# Patient Record
Sex: MALE | Race: WHITE | NOT HISPANIC OR LATINO | URBAN - METROPOLITAN AREA
[De-identification: names, ages, dates, MRNs, and addresses within clinical notes are randomized per-mention and may not be internally consistent; named-entity substitution may affect disease eponyms.]

---

## 2019-08-18 ENCOUNTER — EMERGENCY (EMERGENCY)
Facility: HOSPITAL | Age: 39
LOS: 0 days | Discharge: HOME | End: 2019-08-18
Attending: EMERGENCY MEDICINE | Admitting: EMERGENCY MEDICINE
Payer: COMMERCIAL

## 2019-08-18 ENCOUNTER — TRANSCRIPTION ENCOUNTER (OUTPATIENT)
Age: 39
End: 2019-08-18

## 2019-08-18 VITALS
TEMPERATURE: 97 F | RESPIRATION RATE: 18 BRPM | WEIGHT: 199.96 LBS | DIASTOLIC BLOOD PRESSURE: 78 MMHG | SYSTOLIC BLOOD PRESSURE: 135 MMHG | OXYGEN SATURATION: 99 % | HEART RATE: 86 BPM | HEIGHT: 71 IN

## 2019-08-18 DIAGNOSIS — R09.81 NASAL CONGESTION: ICD-10-CM

## 2019-08-18 DIAGNOSIS — R51 HEADACHE: ICD-10-CM

## 2019-08-18 PROCEDURE — 99283 EMERGENCY DEPT VISIT LOW MDM: CPT

## 2019-08-18 RX ORDER — KETOROLAC TROMETHAMINE 30 MG/ML
60 SYRINGE (ML) INJECTION ONCE
Refills: 0 | Status: DISCONTINUED | OUTPATIENT
Start: 2019-08-18 | End: 2019-08-18

## 2019-08-18 RX ADMIN — Medication 60 MILLIGRAM(S): at 16:02

## 2019-08-18 NOTE — ED PROVIDER NOTE - ATTENDING CONTRIBUTION TO CARE
39 year old male, pmhx migraines, presenting with HA on the right side x 3 days. States it occasionally goes down to his jaw on the right side as well. Seen at  this AM and started on sudafed, nasal spray and given PO amox, which he took without relief at home x1. States pain is achy, radiating downward to his jaw, no palliative or provocative factors, mild severity. Otherwise denies vision changes, dizziness, fevers, nausea or vomiting.    Vital Signs: I have reviewed the initial vital signs.  Constitutional: NAD, well-nourished, appears stated age, no acute distress.  HEENT: Airway patent, moist MM, no erythema/swelling/deformity of oral structures. EOMI, PERRLA. (+) tenderness over right frontal sinus  CV: regular rate, regular rhythm, well-perfused extremities, 2+ b/l DP and radial pulses equal.  Lungs: BCTA, no increased WOB.  ABD: NTND, no guarding or rebound, no pulsatile mass, no hernias.   MSK: Neck supple, nontender, nl ROM, no stepoff. Chest nontender. Back nontender in TLS spine or to b/l bony structures or flanks. Ext nontender, nl rom, no deformity.   INTEG: Skin warm, dry, no rash.  NEURO: A&Ox3, normal strength, nl sensation throughout, normal speech.   PSYCH: Calm, cooperative, normal affect and interaction.    Likely 2/2 sinusitis. patient already prescribed abx, symptomatic tx at home from . Will give IM toradol here for pain relief, refer to outpatient ENT.

## 2019-08-18 NOTE — ED PROVIDER NOTE - OBJECTIVE STATEMENT
38yo M presents c/o a HA on the right side x 3 days. Pt states that the pain started in his right jaw and spread to the right side of the head and around his right eye. Pt went to an [facility] this morning and was started on Sudafed and a nasal spray, returned to the [facility] and requested ABX which he took Amox at 230pm. Pt denies any dizziness, visual changes, nausea, vomiting, fever.

## 2019-08-18 NOTE — ED ADULT NURSE NOTE - NSIMPLEMENTINTERV_GEN_ALL_ED
Implemented All Universal Safety Interventions:  [place] to call system. Call bell, personal items and telephone within reach. Instruct patient to call for assistance. Room bathroom lighting operational. Non-slip footwear when patient is off stretcher. Physically safe environment: no spills, clutter or unnecessary equipment. Stretcher in lowest position, wheels locked, appropriate side rails in place.

## 2019-08-18 NOTE — ED PROVIDER NOTE - CLINICAL SUMMARY MEDICAL DECISION MAKING FREE TEXT BOX
Patient presented with sinus head pain x 3 days. Otherwise afebrile, HD stable, fully neurovascular intact, ambulatory without difficulty. Tender over right frontal sinus. Patient already prescribed PO abx x1 day along with nasal spray and sudafed. Given IM toradol in ED with improvement. Will discharge home with outpatient ENT follow up. Patient agreeable with plan. Agrees to return to ED for any new or worsening symptoms.

## 2019-08-18 NOTE — ED PROVIDER NOTE - NSFOLLOWUPINSTRUCTIONS_ED_ALL_ED_FT
Sinusitis, Adult    Sinusitis is soreness and inflammation of your sinuses. Sinuses are hollow spaces in the bones around your face. Your sinuses are located:     Around your eyes.  In the middle of your forehead.  Behind your nose.  In your cheekbones.    Your sinuses and nasal passages are lined with a stringy fluid (mucus). Mucus normally drains out of your sinuses. When your nasal tissues become inflamed or swollen, the mucus can become trapped or blocked so air cannot flow through your sinuses. This allows bacteria, viruses, and funguses to grow, which leads to infection.    Sinusitis can develop quickly and last for 7?10 days (acute) or for more than 12 weeks (chronic). Sinusitis often develops after a cold.    CAUSES  This condition is caused by anything that creates swelling in the sinuses or stops mucus from draining, including:    Allergies.  Asthma.  Bacterial or viral infection.  Abnormally shaped bones between the nasal passages.  Nasal growths that contain mucus (nasal polyps).  Narrow sinus openings.  Pollutants, such as chemicals or irritants in the air.  A foreign object stuck in the nose.  A fungal infection. This is rare.     RISK FACTORS  The following factors may make you more likely to develop this condition:    Having allergies or asthma.  Having had a recent cold or respiratory tract infection.  Having structural deformities or blockages in your nose or sinuses.  Having a weak immune system.  Doing a lot of swimming or diving.  Overusing nasal sprays.  Smoking.    SYMPTOMS  The main symptoms of this condition are pain and a feeling of pressure around the affected sinuses. Other symptoms include:    Upper toothache.  Earache.  Headache.  Bad breath.  Decreased sense of smell and taste.  A cough that may get worse at night.  Fatigue.  Fever.  Thick drainage from your nose. The drainage is often green and it may contain pus (purulent).  Stuffy nose or congestion.  Postnasal drip. This is when extra mucus collects in the throat or back of the nose.  Swelling and warmth over the affected sinuses.  Sore throat.  Sensitivity to light.    DIAGNOSIS  This condition is diagnosed based on symptoms, a medical history, and a physical exam. To find out if your condition is acute or chronic, your health care provider may:    Look in your nose for signs of nasal polyps.  Tap over the affected sinus to check for signs of infection.  View the inside of your sinuses using an imaging device that has a light attached (endoscope).    If your health care provider suspects that you have chronic sinusitis, you may also:    Be tested for allergies.  Have a sample of mucus taken from your nose (nasal culture) and checked for bacteria.  Have a mucus sample examined to see if your sinusitis is related to an allergy.    If your sinusitis does not respond to treatment and it lasts longer than 8 weeks, you may have an MRI or CT scan to check your sinuses. These scans also help to determine how severe your infection is.    In rare cases, a bone biopsy may be done to rule out more serious types of fungal sinus disease.    TREATMENT  Treatment for sinusitis depends on the cause and whether your condition is chronic or acute. If a virus is causing your sinusitis, your symptoms will go away on their own within 10 days. You may be given medicines to relieve your symptoms, including:    Topical nasal decongestants. They shrink swollen nasal passages and let mucus drain from your sinuses.  Antihistamines. These drugs block inflammation that is triggered by allergies. This can help to ease swelling in your nose and sinuses.  Topical nasal corticosteroids. These are nasal sprays that ease inflammation and swelling in your nose and sinuses.  Nasal saline washes. These rinses can help to get rid of thick mucus in your nose.    If your condition is caused by bacteria, you will be given an antibiotic medicine. If your condition is caused by a fungus, you will be given an antifungal medicine.    Surgery may be needed to correct underlying conditions, such as narrow nasal passages. Surgery may also be needed to remove polyps.    HOME CARE INSTRUCTIONS  Medicines    Take, use, or apply over-the-counter and prescription medicines only as told by your health care provider. These may include nasal sprays.  If you were prescribed an antibiotic medicine, take it as told by your health care provider. Do not stop taking the antibiotic even if you start to feel better.    Hydrate and Humidify    Drink enough water to keep your urine clear or pale yellow. Staying hydrated will help to thin your mucus.  Use a cool mist humidifier to keep the humidity level in your home above 50%.  Inhale steam for 10–15 minutes, 3–4 times a day or as told by your health care provider. You can do this in the bathroom while a hot shower is running.  Limit your exposure to cool or dry air.    Rest    Rest as much as possible.  Sleep with your head raised (elevated).  Make sure to get enough sleep each night.    General Instructions    Apply a warm, moist washcloth to your face 3–4 times a day or as told by your health care provider. This will help with discomfort.  Wash your hands often with soap and water to reduce your exposure to viruses and other germs. If soap and water are not available, use hand .  Do not smoke. Avoid being around people who are smoking (secondhand smoke).  Keep all follow-up visits as told by your health care provider. This is important.    SEEK MEDICAL CARE IF:  You have a fever.  Your symptoms get worse.  Your symptoms do not improve within 10 days.    SEEK IMMEDIATE MEDICAL CARE IF:  You have a severe headache.  You have persistent vomiting.  You have pain or swelling around your face or eyes.  You have vision problems.  You develop confusion.  Your neck is stiff.  You have trouble breathing.    ADDITIONAL NOTES AND INSTRUCTIONS    Please follow up with your Primary MD in 24-48 hr.  Seek immediate medical care for any new/worsening signs or symptoms.

## 2019-08-18 NOTE — ED PROVIDER NOTE - CARE PROVIDER_API CALL
Franklin Conn)  Otolaryngology  [address] [place] [address], [address] [place] [address]  Phone: [phone]  Fax: [phone]  Follow Up Time:

## 2019-08-18 NOTE — ED PROVIDER NOTE - PHYSICAL EXAMINATION
ENT: right TM with fluid, no erythema, ENT:mild fluid appreciated behind right TM, no erythema, no buldging   non tender to sinuses to percussion  no dental tenderness or gum swelling

## 2019-08-19 ENCOUNTER — APPOINTMENT (OUTPATIENT)
Dept: OTOLARYNGOLOGY | Facility: CLINIC | Age: 39
End: 2019-08-19
Payer: COMMERCIAL

## 2019-08-19 VITALS — WEIGHT: 195 LBS | HEIGHT: 71 IN | BODY MASS INDEX: 27.3 KG/M2

## 2019-08-19 DIAGNOSIS — R51 HEADACHE: ICD-10-CM

## 2019-08-19 DIAGNOSIS — Z78.9 OTHER SPECIFIED HEALTH STATUS: ICD-10-CM

## 2019-08-19 DIAGNOSIS — Z87.09 PERSONAL HISTORY OF OTHER DISEASES OF THE RESPIRATORY SYSTEM: ICD-10-CM

## 2019-08-19 PROBLEM — Z00.00 ENCOUNTER FOR PREVENTIVE HEALTH EXAMINATION: Status: ACTIVE | Noted: 2019-08-19

## 2019-08-19 PROBLEM — G43.909 MIGRAINE, UNSPECIFIED, NOT INTRACTABLE, WITHOUT STATUS MIGRAINOSUS: Chronic | Status: ACTIVE | Noted: 2019-08-18

## 2019-08-19 PROCEDURE — 31231 NASAL ENDOSCOPY DX: CPT

## 2019-08-19 PROCEDURE — 99203 OFFICE O/P NEW LOW 30 MIN: CPT | Mod: 25

## 2019-08-19 RX ORDER — AMOXICILLIN AND CLAVULANATE POTASSIUM 500; 125 MG/1; MG/1
500-125 TABLET, FILM COATED ORAL
Qty: 14 | Refills: 0 | Status: ACTIVE | COMMUNITY
Start: 2019-08-18

## 2019-08-19 RX ORDER — MELOXICAM 15 MG/1
15 TABLET ORAL
Qty: 30 | Refills: 0 | Status: ACTIVE | COMMUNITY
Start: 2019-04-03

## 2019-08-19 RX ORDER — AZELASTINE HYDROCHLORIDE 137 UG/1
0.1 SPRAY, METERED NASAL
Qty: 30 | Refills: 0 | Status: ACTIVE | COMMUNITY
Start: 2019-08-18

## 2019-08-19 RX ORDER — FLUTICASONE PROPIONATE 50 UG/1
50 SPRAY, METERED NASAL
Qty: 16 | Refills: 0 | Status: ACTIVE | COMMUNITY
Start: 2019-06-06

## 2019-08-19 RX ORDER — SUMATRIPTAN 100 MG/1
100 TABLET, FILM COATED ORAL
Qty: 27 | Refills: 0 | Status: ACTIVE | COMMUNITY
Start: 2018-11-06

## 2019-08-19 NOTE — HISTORY OF PRESENT ILLNESS
[de-identified] : Patient here today c/o severe sinus pain. Patient admits the pain started about 1 week ago. Recently seen in ER yesterday due to severe right sided facial pain. Pain was in his forehead and teeth. Patient did not have any imaging. Patient currently on Amox/clav 800mg bid. \par Patient denies any history of frequent sinus infections.  he grades his symptoms as 10 out of 10 and indicates that it is interfering with his normal activities. He has not found anything that makes it better or worse. he has a h/o migraines.

## 2019-08-19 NOTE — PHYSICAL EXAM
[Nasal Endoscopy Performed] : nasal endoscopy was performed, see procedure section for findings [Normal] : mucosa is normal [Midline] : trachea located in midline position [de-identified] : left cerumen impaction

## 2019-08-19 NOTE — PROCEDURE
[Topical Lidocaine] : topical lidocaine [Oxymetazoline HCl] : oxymetazoline HCl [Rigid Endoscope] : examined with a rigid endoscope [Anterior rhinoscopy insufficient to account for symptoms] : anterior rhinoscopy insufficient to account for symptoms [Red] : red [Congested] : congested [Normal] : the paranasal sinuses had no abnormalities [Paranasal Sinuses Ethmoid Sinus] : ethmoid purulence on the right [FreeTextEntry6] : The following anatomic sites were directly examined in a sequential fashion:\par The scope was introduced in the nasal passage between the middle and inferior turbinates to exam the inferior portion of the middle meatus and the fontanelle, as well as the maxillary ostia. Next, the scope was passed medically and posteriorly to the middle turbinates to examine the sphenoethmoid recess and the superior turbinate region.\par  [Maxillary Sinus Right Drainage] : maxillary purulence on the right [de-identified] : pain

## 2020-08-28 ENCOUNTER — APPOINTMENT (OUTPATIENT)
Dept: NEUROLOGY | Facility: CLINIC | Age: 40
End: 2020-08-28
Payer: COMMERCIAL

## 2020-08-28 VITALS
SYSTOLIC BLOOD PRESSURE: 121 MMHG | WEIGHT: 195 LBS | BODY MASS INDEX: 27.3 KG/M2 | HEART RATE: 58 BPM | OXYGEN SATURATION: 98 % | DIASTOLIC BLOOD PRESSURE: 80 MMHG | TEMPERATURE: 98.2 F | HEIGHT: 71 IN

## 2020-08-28 DIAGNOSIS — G43.901 MIGRAINE, UNSPECIFIED, NOT INTRACTABLE, WITH STATUS MIGRAINOSUS: ICD-10-CM

## 2020-08-28 PROCEDURE — 99203 OFFICE O/P NEW LOW 30 MIN: CPT

## 2020-08-28 RX ORDER — PREDNISONE 20 MG/1
20 TABLET ORAL
Qty: 16 | Refills: 0 | Status: ACTIVE | COMMUNITY
Start: 2020-08-28 | End: 1900-01-01

## 2020-08-28 NOTE — PHYSICAL EXAM
[FreeTextEntry1] : Mental status: Awake, alert and oriented x3.  Recent and remote memory intact.  Naming, repetition and comprehension intact.  Attention/concentration intact.  No dysarthria, no aphasia.  Fund of knowledge appropriate.  \par Cranial nerves: Pupils equally round and reactive to light, visual fields full, no nystagmus, extraocular muscles intact, V1 through V3 intact bilaterally and symmetric, face symmetric, hearing intact to finger rub, palate elevation symmetric, tongue was midline.\par Motor:  MRC grading 5/5 b/l UE/LE.   strength 5/5.  Normal tone and bulk.  No abnormal movements.  \par Sensation: Intact to light touch, proprioception, and pinprick. \par Coordination: No dysmetria on finger-to-nose and heel-to-shin.  No dysdiadokinesia.\par Reflexes: 2+ in bilateral UE/LE, downgoing toes bilaterally. (-) [place].\par Gait: Narrow and steady. No ataxia.  Romberg negative\par \par

## 2020-08-28 NOTE — ASSESSMENT
[FreeTextEntry1] : LEAH KHALIL is a 40 year old man is being seen as new patient for migraine. He has migraine headache since childhood for about 1-2 per month which usually respond well to sumatriptan 100 mg as needed. He is now off from meds due to medication issues. I will try to renew his prescription hoping that this time will be approved since it is prescribed by neurologist. Meanwhile will given him prednisone taper for status migrainous since he has had constant migraine for the past 5 days. \par \par RTC in 3 months

## 2020-08-28 NOTE — HISTORY OF PRESENT ILLNESS
[FreeTextEntry1] : LEAH KHALIL is a 40 year old man is being seen as a new patient to establish his care with neurology. He started to have migraine headaches since childhood. He has been on sumatriptan 100 mg as needed. He usually he has 1-2 migraine per month. They last for few hours for entire day. it usually starts with pressure and pain behind the eyes and transit to throbbing pain and sometimes back of his eyes. He reports light  and sound sensitivity and nausea. Triggers are alcohol and certain smells and cold weather. He never tried the migraine preventive medication. No he has had constant headache for the past five days and could not refill the sumatriptan since his insurance is not covering it anymore.

## 2020-11-12 ENCOUNTER — EMERGENCY (EMERGENCY)
Facility: HOSPITAL | Age: 40
LOS: 0 days | Discharge: HOME | End: 2020-11-12
Attending: EMERGENCY MEDICINE | Admitting: EMERGENCY MEDICINE
Payer: OTHER MISCELLANEOUS

## 2020-11-12 VITALS
RESPIRATION RATE: 18 BRPM | WEIGHT: 190.04 LBS | HEART RATE: 70 BPM | SYSTOLIC BLOOD PRESSURE: 150 MMHG | DIASTOLIC BLOOD PRESSURE: 96 MMHG | TEMPERATURE: 97 F | OXYGEN SATURATION: 100 % | HEIGHT: 71 IN

## 2020-11-12 VITALS — SYSTOLIC BLOOD PRESSURE: 126 MMHG | HEART RATE: 76 BPM | DIASTOLIC BLOOD PRESSURE: 76 MMHG
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
## 2020-11-12 DIAGNOSIS — M25.562 PAIN IN LEFT KNEE: ICD-10-CM

## 2020-11-12 DIAGNOSIS — M25.569 PAIN IN UNSPECIFIED KNEE: ICD-10-CM
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
## 2020-11-12 PROCEDURE — 73562 X-RAY EXAM OF KNEE 3: CPT | Mod: 26,LT

## 2020-11-12 PROCEDURE — 99283 EMERGENCY DEPT VISIT LOW MDM: CPT

## 2020-11-12 RX ORDER — IBUPROFEN 200 MG
600 TABLET ORAL ONCE
Refills: 0 | Status: COMPLETED | OUTPATIENT
Start: 2020-11-12 | End: 2020-11-12

## 2020-11-12 RX ADMIN — Medication 600 MILLIGRAM(S): at 08:52

## 2020-11-12 NOTE — ED PROVIDER NOTE - PHYSICAL EXAMINATION
Constitutional: Well developed, well nourished. NAD. Good general hygiene  Head: Atraumatic.  Extremities. Pelvis stable. No lower extremity edema. No obvious deformity of LLE.  Skin: Erythema inferior to L knee on anterior aspect. No ecchymosis.  Neuro: AAOx3. Sensation intact throughout LLE.

## 2020-11-12 NOTE — ED PROVIDER NOTE - OBJECTIVE STATEMENT
40M no pmh p/w l knee pain. onset 1 hour pta, pt is  and hit l knee on metal step getting onto ambulance. pain is 7/10, located on l knee, going to top of L shin. denies fever/chills, cp, sob, abd pain, n/v. no numbness/parasthesias or weakness of LLE.

## 2020-11-12 NOTE — ED PROVIDER NOTE - PATIENT PORTAL LINK FT
You can access the FollowMyHealth Patient Portal offered by [facility] by registering at the following website: [email or web address]. By joining [facility]’s FollowMyHealth portal, you will also be able to view your health information using other applications (apps) compatible with our system.

## 2020-11-12 NOTE — ED PROVIDER NOTE - CLINICAL SUMMARY MEDICAL DECISION MAKING FREE TEXT BOX
knee contusion joint stable FROM with pain on extension and ttp over tuberosity but extensor mechanism intact, NVI, imaging appreciated, will immobilize, ortho f/u

## 2020-11-12 NOTE — ED PROVIDER NOTE - NS ED ROS FT
General: No fever, chills, or weakness.  MS: L knee and upper shin pain. No LLE weakness. No myalgia, muscle weakness, joint pain or back pain.  Neuro:  no numbness/parasthesias of LLE. No headache.  No LOC. No change in ambulation. No dizziness.  Skin: Erythema L knee.

## 2020-11-12 NOTE — ED PROVIDER NOTE - NSFOLLOWUPINSTRUCTIONS_ED_ALL_ED_FT

## 2020-11-12 NOTE — ED PROVIDER NOTE - CARE PROVIDER_API CALL
Jo Ann Bowen (MD)  Surgery of the Hand  [address] [place] [address]  Phone: [phone]  Fax: [phone]  Follow Up Time: 1-3 Days

## 2020-11-12 NOTE — ED ADULT NURSE NOTE - NSIMPLEMENTINTERV_GEN_ALL_ED
Implemented All Fall with Harm Risk Interventions:  [place] to call system. Call bell, personal items and telephone within reach. Instruct patient to call for assistance. Room bathroom lighting operational. Non-slip footwear when patient is off stretcher. Physically safe environment: no spills, clutter or unnecessary equipment. Stretcher in lowest position, wheels locked, appropriate side rails in place. Provide visual cue, wrist band, yellow gown, etc. Monitor gait and stability. Monitor for mental status changes and reorient to person, place, and time. Review medications for side effects contributing to fall risk. Reinforce activity limits and safety measures with patient and family. Provide visual clues: red socks.

## 2020-11-15 ENCOUNTER — EMERGENCY (EMERGENCY)
Facility: HOSPITAL | Age: 40
LOS: 0 days | Discharge: HOME | End: 2020-11-15
Attending: EMERGENCY MEDICINE | Admitting: EMERGENCY MEDICINE
Payer: OTHER MISCELLANEOUS

## 2020-11-15 VITALS
WEIGHT: 190.04 LBS | HEART RATE: 74 BPM | HEIGHT: 71 IN | SYSTOLIC BLOOD PRESSURE: 147 MMHG | OXYGEN SATURATION: 97 % | DIASTOLIC BLOOD PRESSURE: 67 MMHG | TEMPERATURE: 96 F | RESPIRATION RATE: 18 BRPM

## 2020-11-15 DIAGNOSIS — Z79.899 OTHER LONG TERM (CURRENT) DRUG THERAPY: ICD-10-CM

## 2020-11-15 DIAGNOSIS — M25.462 EFFUSION, LEFT KNEE: ICD-10-CM

## 2020-11-15 DIAGNOSIS — M25.562 PAIN IN LEFT KNEE: ICD-10-CM

## 2020-11-15 DIAGNOSIS — M25.569 PAIN IN UNSPECIFIED KNEE: ICD-10-CM

## 2020-11-15 PROCEDURE — 99053 MED SERV 10PM-8AM 24 HR FAC: CPT

## 2020-11-15 PROCEDURE — 99284 EMERGENCY DEPT VISIT MOD MDM: CPT

## 2020-11-15 RX ORDER — KETOROLAC TROMETHAMINE 30 MG/ML
30 SYRINGE (ML) INJECTION ONCE
Refills: 0 | Status: DISCONTINUED | OUTPATIENT
Start: 2020-11-15 | End: 2020-11-15

## 2020-11-15 RX ORDER — SUMATRIPTAN SUCCINATE 4 MG/.5ML
1 INJECTION, SOLUTION SUBCUTANEOUS
Qty: 0 | Refills: 0 | DISCHARGE

## 2020-11-15 RX ADMIN — Medication 30 MILLIGRAM(S): at 00:56

## 2020-11-15 NOTE — ED PROVIDER NOTE - PATIENT PORTAL LINK FT
You can access the FollowMyHealth Patient Portal offered by [facility] by registering at the following website: [email or web address]. By joining [facility]’s FollowMyHealth portal, you will also be able to view your health information using other applications (apps) compatible with our system.

## 2020-11-15 NOTE — ED PROVIDER NOTE - PHYSICAL EXAMINATION
Physical Exam    Vital Signs: I have reviewed the initial vital signs.  Constitutional: well-nourished, appears stated age, no acute distress  Musculoskeletal: supple neck, no lower extremity edema, no midline tenderness + Left knee swelling supra patellar, FROM of joint with pain on extenstion/flexion, no laxity noted. neg anterior and posterior draw sign.   Integumentary: warm, dry, no rash  Neurologic: awake, alert, cranial nerves II-XII grossly intact, extremities’ motor and sensory functions grossly intact  Psychiatric: appropriate mood, appropriate affect

## 2020-11-15 NOTE — ED PROVIDER NOTE - ATTENDING CONTRIBUTION TO CARE
I personally evaluated the patient. I reviewed the Resident’s or Physician Assistant’s note (as assigned above), and agree with the findings and plan except as documented in my note.  Chart reviewed. Was seen in ED 2 days ago for left knee pain after banging it on steps, here for pain and requests an MRI. Has an appointment with [facility] physician and orthopedist. Exam shows tenderness and swelling left knee, FROM, no instability, neurovascular intact.

## 2020-11-15 NOTE — ED PROVIDER NOTE - NSCAREINITIATED _GEN_ER
Notify your doctor if you develop a fever, cough up blood, have any chest pain, palpitations or difficulty breathing. You may take a throat lozenge if you develop a sore throat or try warm salt water gargle. Resume your diet with soft foods first. Follow up with your cardiologist within 2 weeks for a follow up or sooner with any concerns. Report to the nearest ER with any emergencies.
Thierno Aguilera)

## 2020-11-15 NOTE — ED ADULT NURSE NOTE - NSIMPLEMENTINTERV_GEN_ALL_ED
Implemented All Universal Safety Interventions:  [place] to call system. Call bell, personal items and telephone within reach. Instruct patient to call for assistance. Room bathroom lighting operational. Non-slip footwear when patient is off stretcher. Physically safe environment: no spills, clutter or unnecessary equipment. Stretcher in lowest position, wheels locked, appropriate side rails in place.

## 2020-11-15 NOTE — ED PROVIDER NOTE - CARE PROVIDER_API CALL
Blanco Rudolph  ORTHOPAEDIC SURGERY  [address] [place] [address]  Phone: [phone]  Fax: [phone]  Follow Up Time: 1-3 Days

## 2020-11-15 NOTE — ED PROVIDER NOTE - OBJECTIVE STATEMENT
40 year old male comes to emergency room for left knee pain. pt states that he injured himself several days ago and was seen here in emergency room. patient states xray was normal and was given brace, which he has been wearing to walk. patient states that tonight pain is worse and has some swelling to knee. denies new injury. patient has appointment with orthopedics this week and appointment to see [facility] doctor tomorrow. no fever/chills.

## 2020-11-15 NOTE — ED ADULT TRIAGE NOTE - CHIEF COMPLAINT QUOTE
pt c/o left knee pain, was seen in ED few days ago after an injury to left knee, tonight pain is worse

## 2020-11-15 NOTE — ED PROVIDER NOTE - NSFOLLOWUPINSTRUCTIONS_ED_ALL_ED_FT
Follow up with your primary care doctor and your orthopedic 1-2 days     Knee Sprain    WHAT YOU NEED TO KNOW:    A knee sprain occurs when one or more ligaments in your knee are suddenly stretched or torn. Ligaments are tissues that hold bones together. Ligaments support the knee and keep the joint and bones in the correct position. Knee Anatomy          DISCHARGE INSTRUCTIONS:    Return to the emergency department if:     Any part of your leg feels cold, numb, or looks pale         Contact your healthcare provider if:     You have new or increased swelling, bruising, or pain in your knee.      Your symptoms do not improve within 6 weeks, even with treatment.      You have questions or concerns about your condition or care.     Medicines:     NSAIDs, such as ibuprofen, help decrease swelling, pain, and fever. This medicine is available with or without a doctor's order. NSAIDs can cause stomach bleeding or kidney problems in certain people. If you take blood thinner medicine, always ask your healthcare provider if NSAIDs are safe for you. Always read the medicine label and follow directions.      Acetaminophen decreases pain and fever. It is available without a doctor's order. Ask how much to take and how often to take it. Follow directions. Read the labels of all other medicines you are using to see if they also contain acetaminophen, or ask your doctor or pharmacist. Acetaminophen can cause liver damage if not taken correctly. Do not use more than 4 grams (4,000 milligrams) total of acetaminophen in one day.       Prescription pain medicine may be given. Ask how to take this medicine safely.       Take your medicine as directed. Contact your healthcare provider if you think your medicine is not helping or if you have side effects. Tell him or her if you are allergic to any medicine. Keep a list of the medicines, vitamins, and herbs you take. Include the amounts, and when and why you take them. Bring the list or the pill bottles to follow-up visits. Carry your medicine list with you in case of an emergency.    Self-care:     Rest your knee and do not exercise. You may be told to keep weight off your knee. This means that you should not walk on your injured leg. Rest helps decrease swelling and allows the injury to heal. You can do gentle range of motion (ROM) exercises as directed. This will prevent stiffness.       Apply ice on your knee for 15 to 20 minutes every hour or as directed. Use an ice pack, or put crushed ice in a plastic bag. Cover it with a towel. Ice helps prevent tissue damage and decreases swelling and pain.      Apply compression to your knee as directed. You may need to wear an elastic bandage. This helps keep your injured knee from moving too much while it heals. You can loosen or tighten the elastic bandage to make it comfortable. It should be tight enough for you to feel support. It should not be so tight that it causes your toes to feel numb or tingly. If you are wearing an elastic bandage, take it off and rewrap it once a day.       Elevate your knee above the level of your heart as often as you can. This will help decrease swelling and pain. Prop your leg on pillows or blankets to keep it elevated comfortably. Do not put pillows directly behind your knee.       Use support devices as directed: Support devices such as a splint or brace may be needed. These devices limit movement and protect your joint while it heals. You may be given crutches to use until you can stand on your injured leg without pain. Use devices as directed.     Physical therapy: A physical therapist teaches you exercises to help improve movement and strength, and to decrease pain.     Prevent another knee sprain: Exercise your legs to keep your muscles strong. Strong leg muscles help protect your knee and prevent strain. The following may also prevent a knee sprain:     Slowly start your exercise or training program. Slowly increase the time, distance, and intensity of your exercise. Sudden increases in training may cause you to injure your knee again.       Wear protective braces and equipment as directed. Braces may prevent your knee from moving the wrong way and causing another sprain. Protective equipment may support your bones and ligaments to prevent injury.       Warm up and stretch before exercise. Warm up by walking or using an exercise bike before starting your regular exercise. Do gentle stretches after warming up. This helps to loosen your muscles and decrease stress on your knee. Cool down and stretch after you exercise.       Wear shoes that fit correctly and support your feet. Replace your running or exercise shoes before the padding or shock absorption is worn out. Ask your healthcare provider which exercise shoes are best for you. Ask if you should wear special shoe inserts. Shoe inserts can help support your heels and arches or keep your foot lined up correctly in your shoes. Exercise on flat surfaces.    Follow up with your healthcare provider as directed: Write down your questions so you remember to ask them during your visits.        © Copyright [employer] 2019 All illustrations and images included in CareNotes are the copyrighted property of [employer] or [employer].       Knee effusion means that you have extra fluid in your knee. This can cause pain. Your knee may be more difficult to bend and move.    What are the causes?  Common causes are:  Arthritis.  Infection.  Injury.  Autoimmune disease. This means that your body's defense system (immune system) mistakenly attacks healthy body tissues.  Follow these instructions at home:  Medicines     Take medicines only as told by your doctor.  Do not drive or use heavy machinery while taking prescription pain medicine.  If you are taking prescription pain medicine, take actions to help prevent or treat trouble pooping (constipation). Your doctor may recommend that you:  Drink enough fluid to keep your pee (urine) pale yellow.  Eat foods that are high in fiber. These include fresh fruits and vegetables, whole grains, and beans.  Avoid eating fatty or sweet foods.  Take a medicine for constipation.  If you have a brace:     Wear the brace as told by your doctor. Remove it only as told by your doctor.  Loosen the brace if your toes tingle, become numb, or turn cold and blue.  Keep the brace clean.  If the brace is not waterproof:  Do not let it get wet.  Cover it with a watertight covering when you take a bath or a shower.  Managing pain, stiffness, and swelling     Image   If told, apply ice to the swollen area:  If you have a removable brace, remove it as told by your doctor.  Put ice in a plastic bag.  Place a towel between your skin and the bag.  Leave the ice on for 20 minutes, 2–3 times per day.  Keep your knee raised (elevated) when you are sitting or lying down.  General instructions     Do not use any products that contain nicotine or tobacco, such as cigarettes and e-cigarettes. These can delay healing. If you need help quitting, ask your doctor.  Use crutches as told by your doctor.  Do exercises as told by your doctor.  Rest as told by your doctor.  Keep all follow-up visits as told by your doctor. This is important.  Contact a doctor if you:  Continue to have pain in your knee.  Get help right away if you:  Have swelling or redness of your knee that gets worse or does not get better.  Have serious pain in your knee.  Have a fever.  Summary  Knee effusion is when you have extra fluid in your knee. This causes pain and swelling and makes it hard to bend and move your knee.  Take medicines only as told by your doctor.  If you have a brace, wear the brace as told by your doctor.  This information is not intended to replace advice given to you by your health care provider. Make sure you discuss any questions you have with your health care provider.

## 2020-11-30 ENCOUNTER — APPOINTMENT (OUTPATIENT)
Dept: NEUROLOGY | Facility: CLINIC | Age: 40
End: 2020-11-30

## 2020-12-21 PROBLEM — Z87.09 HISTORY OF ACUTE SINUSITIS: Status: RESOLVED | Noted: 2019-08-19 | Resolved: 2020-12-21

## 2021-01-05 ENCOUNTER — APPOINTMENT (OUTPATIENT)
Dept: NEUROLOGY | Facility: CLINIC | Age: 41
End: 2021-01-05
Payer: COMMERCIAL

## 2021-01-05 PROCEDURE — 99202 OFFICE O/P NEW SF 15 MIN: CPT | Mod: 95

## 2021-01-05 NOTE — HISTORY OF PRESENT ILLNESS
[Home] : at home, [unfilled] , at the time of the visit. [Verbal consent obtained from patient] : the patient, [unfilled] [Medical Office: ([facility])___] : at the medical office located in  [Time Spent: ___ minutes] : I have spent [unfilled] minutes with the patient on the telephone [FreeTextEntry1] : LEAH KHALIL is a 40 year old man is being followed via Telehealth visit for migraine headaches. Visit was done via tele medicine and face to face connection was obtained via [employer]. He was seen as a new patient in August and we were able to approve him for Sumatriptan. He only takes abortive medication for now. Recently he started to notice more frequent migraines. Last month he had migraine almost everyday and wakes up in the morning with headache.

## 2021-01-05 NOTE — PLAN
[FreeTextEntry1] : 40 year old man is being seen via telehealth visit for migraine. He has migraine headache since childhood and used to get headaches for 1-2 per month which usually responds well to sumatriptan 100 mg as needed. Recently he started to notice more frequent migraines for almost every day. \par \par -Start migraine prevention with Inderal 60 mg \par - Magnesium and Riboflavin \par - Sumatriptan 100 mg PRN \par - RTC in 2 months

## 2021-02-11 ENCOUNTER — APPOINTMENT (OUTPATIENT)
Dept: NEUROLOGY | Facility: CLINIC | Age: 41
End: 2021-02-11
Payer: COMMERCIAL

## 2021-02-11 DIAGNOSIS — G43.909 MIGRAINE, UNSPECIFIED, NOT INTRACTABLE, W/OUT STATUS MIGRAINOSUS: ICD-10-CM

## 2021-02-11 PROCEDURE — 99212 OFFICE O/P EST SF 10 MIN: CPT | Mod: 95

## 2021-02-11 NOTE — PLAN
[FreeTextEntry1] : 40 year old man is being seen via telehealth visit for migraine. Had migraines for the first week after last visit and then got better. Since we recently started the Inderal, will continue the same dose for now/ \par \par - cw  Inderal 60 mg \par - Magnesium and Riboflavin \par - Sumatriptan 100 mg PRN \par - RTC in 3 months.

## 2021-02-11 NOTE — HISTORY OF PRESENT ILLNESS
[Home] : at home, [unfilled] , at the time of the visit. [Medical Office: ([facility])___] : at the medical office located in  [Verbal consent obtained from patient] : the patient, [unfilled] [Time Spent: ___ minutes] : I have spent [unfilled] minutes with the patient on the telephone [FreeTextEntry1] : LEAH KHALIL is a 40 year old man is being seen via telehealth visit for migraine. Visit was done via tele medicine and face to face connection was obtained via [employer] He was seen on Jan 2021 and Inderal 60 mg started for migraine prevention. After visit he has migraine days for almost a week and then had no migraine for the past 4 weeks.

## 2021-08-20 ENCOUNTER — TRANSCRIPTION ENCOUNTER (OUTPATIENT)
Age: 41
End: 2021-08-20

## 2021-11-01 ENCOUNTER — EMERGENCY (EMERGENCY)
Facility: HOSPITAL | Age: 41
LOS: 0 days | Discharge: HOME | End: 2021-11-01
Attending: STUDENT IN AN ORGANIZED HEALTH CARE EDUCATION/TRAINING PROGRAM | Admitting: STUDENT IN AN ORGANIZED HEALTH CARE EDUCATION/TRAINING PROGRAM
Payer: OTHER MISCELLANEOUS

## 2021-11-01 VITALS
DIASTOLIC BLOOD PRESSURE: 78 MMHG | WEIGHT: 199.96 LBS | OXYGEN SATURATION: 99 % | HEIGHT: 71 IN | HEART RATE: 67 BPM | SYSTOLIC BLOOD PRESSURE: 126 MMHG | RESPIRATION RATE: 19 BRPM | TEMPERATURE: 98 F

## 2021-11-01 VITALS
RESPIRATION RATE: 18 BRPM | OXYGEN SATURATION: 99 % | HEART RATE: 66 BPM | DIASTOLIC BLOOD PRESSURE: 80 MMHG | SYSTOLIC BLOOD PRESSURE: 128 MMHG

## 2021-11-01 DIAGNOSIS — Z20.822 CONTACT WITH AND (SUSPECTED) EXPOSURE TO COVID-19: ICD-10-CM

## 2021-11-01 DIAGNOSIS — R51.9 HEADACHE, UNSPECIFIED: ICD-10-CM

## 2021-11-01 DIAGNOSIS — G43.909 MIGRAINE, UNSPECIFIED, NOT INTRACTABLE, WITHOUT STATUS MIGRAINOSUS: ICD-10-CM

## 2021-11-01 DIAGNOSIS — R11.0 NAUSEA: ICD-10-CM

## 2021-11-01 DIAGNOSIS — B34.1 ENTEROVIRUS INFECTION, UNSPECIFIED: ICD-10-CM

## 2021-11-01 DIAGNOSIS — J06.9 ACUTE UPPER RESPIRATORY INFECTION, UNSPECIFIED: ICD-10-CM

## 2021-11-01 DIAGNOSIS — R50.9 FEVER, UNSPECIFIED: ICD-10-CM

## 2021-11-01 LAB
ALBUMIN SERPL ELPH-MCNC: 4.8 G/DL — SIGNIFICANT CHANGE UP (ref 3.5–5.2)
ALP SERPL-CCNC: 80 U/L — SIGNIFICANT CHANGE UP (ref 30–115)
ALT FLD-CCNC: 30 U/L — SIGNIFICANT CHANGE UP (ref 0–41)
ANION GAP SERPL CALC-SCNC: 13 MMOL/L — SIGNIFICANT CHANGE UP (ref 7–14)
APPEARANCE UR: CLEAR — SIGNIFICANT CHANGE UP
AST SERPL-CCNC: 19 U/L — SIGNIFICANT CHANGE UP (ref 0–41)
BASOPHILS # BLD AUTO: 0.12 K/UL — SIGNIFICANT CHANGE UP (ref 0–0.2)
BASOPHILS NFR BLD AUTO: 1.3 % — HIGH (ref 0–1)
BILIRUB SERPL-MCNC: 0.4 MG/DL — SIGNIFICANT CHANGE UP (ref 0.2–1.2)
BILIRUB UR-MCNC: NEGATIVE — SIGNIFICANT CHANGE UP
BUN SERPL-MCNC: 19 MG/DL — SIGNIFICANT CHANGE UP (ref 10–20)
CALCIUM SERPL-MCNC: 10.1 MG/DL — SIGNIFICANT CHANGE UP (ref 8.5–10.1)
CHLORIDE SERPL-SCNC: 102 MMOL/L — SIGNIFICANT CHANGE UP (ref 98–110)
CO2 SERPL-SCNC: 23 MMOL/L — SIGNIFICANT CHANGE UP (ref 17–32)
COLOR SPEC: YELLOW — SIGNIFICANT CHANGE UP
CREAT SERPL-MCNC: 1.1 MG/DL — SIGNIFICANT CHANGE UP (ref 0.7–1.5)
DIFF PNL FLD: NEGATIVE — SIGNIFICANT CHANGE UP
EOSINOPHIL # BLD AUTO: 0.4 K/UL — SIGNIFICANT CHANGE UP (ref 0–0.7)
EOSINOPHIL NFR BLD AUTO: 4.4 % — SIGNIFICANT CHANGE UP (ref 0–8)
GLUCOSE SERPL-MCNC: 94 MG/DL — SIGNIFICANT CHANGE UP (ref 70–99)
GLUCOSE UR QL: NEGATIVE MG/DL — SIGNIFICANT CHANGE UP
HCT VFR BLD CALC: 42.5 % — SIGNIFICANT CHANGE UP (ref 42–52)
HGB BLD-MCNC: 15 G/DL — SIGNIFICANT CHANGE UP (ref 14–18)
IMM GRANULOCYTES NFR BLD AUTO: 0.6 % — HIGH (ref 0.1–0.3)
KETONES UR-MCNC: NEGATIVE — SIGNIFICANT CHANGE UP
LEUKOCYTE ESTERASE UR-ACNC: NEGATIVE — SIGNIFICANT CHANGE UP
LYMPHOCYTES # BLD AUTO: 2.39 K/UL — SIGNIFICANT CHANGE UP (ref 1.2–3.4)
LYMPHOCYTES # BLD AUTO: 26.4 % — SIGNIFICANT CHANGE UP (ref 20.5–51.1)
MAGNESIUM SERPL-MCNC: 2 MG/DL — SIGNIFICANT CHANGE UP (ref 1.8–2.4)
MCHC RBC-ENTMCNC: 29.1 PG — SIGNIFICANT CHANGE UP (ref 27–31)
MCHC RBC-ENTMCNC: 35.3 G/DL — SIGNIFICANT CHANGE UP (ref 32–37)
MCV RBC AUTO: 82.4 FL — SIGNIFICANT CHANGE UP (ref 80–94)
MONOCYTES # BLD AUTO: 0.96 K/UL — HIGH (ref 0.1–0.6)
MONOCYTES NFR BLD AUTO: 10.6 % — HIGH (ref 1.7–9.3)
NEUTROPHILS # BLD AUTO: 5.13 K/UL — SIGNIFICANT CHANGE UP (ref 1.4–6.5)
NEUTROPHILS NFR BLD AUTO: 56.7 % — SIGNIFICANT CHANGE UP (ref 42.2–75.2)
NITRITE UR-MCNC: NEGATIVE — SIGNIFICANT CHANGE UP
NRBC # BLD: 0 /100 WBCS — SIGNIFICANT CHANGE UP (ref 0–0)
PH UR: 6.5 — SIGNIFICANT CHANGE UP (ref 5–8)
PLATELET # BLD AUTO: 236 K/UL — SIGNIFICANT CHANGE UP (ref 130–400)
POTASSIUM SERPL-MCNC: 4.2 MMOL/L — SIGNIFICANT CHANGE UP (ref 3.5–5)
POTASSIUM SERPL-SCNC: 4.2 MMOL/L — SIGNIFICANT CHANGE UP (ref 3.5–5)
PROT SERPL-MCNC: 7.5 G/DL — SIGNIFICANT CHANGE UP (ref 6–8)
PROT UR-MCNC: NEGATIVE MG/DL — SIGNIFICANT CHANGE UP
RAPID RVP RESULT: DETECTED
RBC # BLD: 5.16 M/UL — SIGNIFICANT CHANGE UP (ref 4.7–6.1)
RBC # FLD: 12 % — SIGNIFICANT CHANGE UP (ref 11.5–14.5)
RV+EV RNA SPEC QL NAA+PROBE: DETECTED
SARS-COV-2 RNA SPEC QL NAA+PROBE: SIGNIFICANT CHANGE UP
SODIUM SERPL-SCNC: 138 MMOL/L — SIGNIFICANT CHANGE UP (ref 135–146)
SP GR SPEC: 1.01 — SIGNIFICANT CHANGE UP (ref 1.01–1.03)
UROBILINOGEN FLD QL: 0.2 MG/DL — SIGNIFICANT CHANGE UP
WBC # BLD: 9.05 K/UL — SIGNIFICANT CHANGE UP (ref 4.8–10.8)
WBC # FLD AUTO: 9.05 K/UL — SIGNIFICANT CHANGE UP (ref 4.8–10.8)

## 2021-11-01 PROCEDURE — 93010 ELECTROCARDIOGRAM REPORT: CPT

## 2021-11-01 PROCEDURE — 71046 X-RAY EXAM CHEST 2 VIEWS: CPT | Mod: 26

## 2021-11-01 PROCEDURE — 99284 EMERGENCY DEPT VISIT MOD MDM: CPT

## 2021-11-01 RX ORDER — ONDANSETRON 8 MG/1
4 TABLET, FILM COATED ORAL ONCE
Refills: 0 | Status: COMPLETED | OUTPATIENT
Start: 2021-11-01 | End: 2021-11-01

## 2021-11-01 RX ORDER — SODIUM CHLORIDE 9 MG/ML
1000 INJECTION INTRAMUSCULAR; INTRAVENOUS; SUBCUTANEOUS ONCE
Refills: 0 | Status: COMPLETED | OUTPATIENT
Start: 2021-11-01 | End: 2021-11-01

## 2021-11-01 RX ADMIN — SODIUM CHLORIDE 2000 MILLILITER(S): 9 INJECTION INTRAMUSCULAR; INTRAVENOUS; SUBCUTANEOUS at 20:18

## 2021-11-01 RX ADMIN — ONDANSETRON 4 MILLIGRAM(S): 8 TABLET, FILM COATED ORAL at 20:18

## 2021-11-01 NOTE — ED PROVIDER NOTE - PATIENT PORTAL LINK FT
You can access the FollowMyHealth Patient Portal offered by [facility] by registering at the following website: [email or web address]. By joining [facility]’s FollowMyHealth portal, you will also be able to view your health information using other applications (apps) compatible with our system.

## 2021-11-01 NOTE — ED PROVIDER NOTE - OBJECTIVE STATEMENT
40 y/o male with no significant PMH who presents with flu-like symptoms including chills, headache, nausea, and chills 3 hours PTA. Reports that he has been vaccinated against COVID. Also endorses a recorded fever of 100 PTA. Pt did not take any meds for his symptoms. 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 Denies neck pain/rigidity, photophobia, rash, chest pain, vomiting, abdominal pain, urinary symptoms, and change in bowel movement.

## 2021-11-01 NOTE — ED PROVIDER NOTE - NSFOLLOWUPCLINICS_GEN_ALL_ED_FT
[facility]  Medicine  [address], [place]   Phone: [phone]  Fax:   Follow Up Time: 1-3 Days

## 2021-11-01 NOTE — ED PROVIDER NOTE - NSFOLLOWUPINSTRUCTIONS_ED_ALL_ED_FT
Upper Respiratory Infection, Adult      An upper respiratory infection (URI) is a common viral infection of the nose, throat, and upper air passages that lead to the lungs. The most common type of URI is the common cold. URIs usually get better on their own, without medical treatment.      What are the causes?  A URI is caused by a virus. You may catch a virus by:  •Breathing in droplets from an infected person's cough or sneeze.      •Touching something that has been exposed to the virus (contaminated) and then touching your mouth, nose, or eyes.        What increases the risk?  You are more likely to get a URI if:  •You are very young or very old.      •It is carlos or winter.      •You have close contact with others, such as at a , school, or health care facility.      •You smoke.      •You have long-term (chronic) heart or lung disease.      •You have a weakened disease-fighting (immune) system.      •You have nasal allergies or asthma.      •You are experiencing a lot of stress.      •You work in an area that has poor air circulation.      •You have poor nutrition.        What are the signs or symptoms?  A URI usually involves some of the following symptoms:  •Runny or stuffy (congested) nose.      •Sneezing.      •Cough.      •Sore throat.      •Headache.      •Fatigue.      •Fever.      •Loss of appetite.      •Pain in your forehead, behind your eyes, and over your cheekbones (sinus pain).      •Muscle aches.      •Redness or irritation of the eyes.      •Pressure in the ears or face.        How is this diagnosed?    This condition may be diagnosed based on your medical history and symptoms, and a physical exam. Your health care provider may use a cotton swab to take a mucus sample from your nose (nasal swab). This sample can be tested to determine what virus is causing the illness.      How is this treated?  URIs usually get better on their own within 7–10 days. You can take steps at home to relieve your symptoms. Medicines cannot cure URIs, but your health care provider may recommend certain medicines to help relieve symptoms, such as:  •Over-the-counter cold medicines.      •Cough suppressants. Coughing is a type of defense against infection that helps to clear the respiratory system, so take these medicines only as recommended by your health care provider.      •Fever-reducing medicines.        Follow these instructions at home:    Activity     •Rest as needed.      •If you have a fever, stay home from work or school until your fever is gone or until your health care provider says you are no longer contagious. Your health care provider may have you wear a face mask to prevent your infection from spreading.      Relieving symptoms     •Gargle with a salt-water mixture 3–4 times a day or as needed. To make a salt-water mixture, completely dissolve ½–1 tsp of salt in 1 cup of warm water.      •Use a cool-mist humidifier to add moisture to the air. This can help you breathe more easily.        Eating and drinking      •Drink enough fluid to keep your urine pale yellow.      •Eat soups and other clear broths.        General instructions      •Take over-the-counter and prescription medicines only as told by your health care provider. These include cold medicines, fever reducers, and cough suppressants.      • Do not use any products that contain nicotine or tobacco, such as cigarettes and e-cigarettes. If you need help quitting, ask your health care provider.       •Stay away from secondhand smoke.      •Stay up to date on all immunizations, including the yearly (annual) flu vaccine.      •Keep all follow-up visits as told by your health care provider. This is important.        How to prevent the spread of infection to others    •URIs can be passed from person to person (are contagious). To prevent the infection from spreading:  •Wash your hands often with soap and water. If soap and water are not available, use hand .      •Avoid touching your mouth, face, eyes, or nose.      •Cough or sneeze into a tissue or your sleeve or elbow instead of into your hand or into the air.          Contact a health care provider if:    •You are getting worse instead of better.      •You have a fever or chills.      •Your mucus is brown or red.      •You have yellow or brown discharge coming from your nose.      •You have pain in your face, especially when you bend forward.      •You have swollen neck glands.      •You have pain while swallowing.      •You have white areas in the back of your throat.        Get help right away if:    •You have shortness of breath that gets worse.    •You have severe or persistent:  •Headache.      •Ear pain.      •Sinus pain.      •Chest pain.      •You have chronic lung disease along with any of the following:  •Wheezing.      •Prolonged cough.      •Coughing up blood.      •A change in your usual mucus.        •You have a stiff neck.    •You have changes in your:  •Vision.      •Hearing.      •Thinking.      •Mood.          Summary    •An upper respiratory infection (URI) is a common infection of the nose, throat, and upper air passages that lead to the lungs.      •A URI is caused by a virus.      •URIs usually get better on their own within 7–10 days.      •Medicines cannot cure URIs, but your health care provider may recommend certain medicines to help relieve symptoms.      This information is not intended to replace advice given to you by your health care provider. Make sure you discuss any questions you have with your health care provider.

## 2021-11-01 NOTE — ED PROVIDER NOTE - PHYSICAL EXAMINATION
CONSTITUTIONAL: Well-appearing; well-nourished; in no apparent distress.   HEAD: Normocephalic; atraumatic.   EYES: Pupils are round and reactive, extra-ocular muscles are intact. Eyelids are normal in appearance without swelling or lesions.   ENT: External ears are non-tender and without swelling or erythema. Hearing is intact with good acuity to spoken voice.  Patient is speaking clearly, not muffled and airway is intact.   NECK: Neck is supple without adenopathy. Trachea is midline.   RESPIRATORY: Chest wall is symmetric without deformity. No signs of respiratory distress. Lung sounds are clear in all lobes bilaterally without rales, rhonchi, or wheezes.  CARDIOVASCULAR: Regular rate and rhythm. Normal peripheral perfusion.   GI: Abdomen is soft, non-tender, and without distention. Bowel sounds are present and normoactive in all four quadrants. No masses are noted. No rebound, guarding, or rigidity.  BACK: No evidence of trauma or deformity. No midline tenderness. Normal ROM.   EXT: Normal appearance and ROM in all four extremities. No tenderness to palpation and distal pulses are normal. Sensation to the upper and lower extremities is normal bilaterally. Steady gait noted.  SKIN: Skin is warm, dry and intact without apparent rashes or lesions.   NEURO: A & O x 4. Normal speech. Motor function is normal with good muscle strength to upper and lower extremities. Sensation is intact to all extremities. Cranial nerves are intact.  PSYCHOLOGICAL: Appropriate mood and affect. Good judgement and insight. No visual or auditory hallucinations. No suicidal or homicidal ideation. CONSTITUTIONAL: Well-appearing; well-nourished; in no apparent distress.   ENT: Hearing is intact with good acuity to spoken voice.  Patient is speaking clearly, not muffled and airway is intact.   RESPIRATORY: Chest wall is symmetric without deformity. No signs of respiratory distress. Lung sounds are clear in all lobes bilaterally without rales, rhonchi, or wheezes.  CARDIOVASCULAR: Regular rate and rhythm. Normal peripheral perfusion.   GI: Abdomen is soft, non-tender, and without distention.   NEURO: A & O x 4. Normal speech.

## 2021-11-01 NOTE — ED PROVIDER NOTE - CLINICAL SUMMARY MEDICAL DECISION MAKING FREE TEXT BOX
pt with no PMH pw flu like symptoms total body malaise chills x 3 hours. + vaccinated against covid no sick contacts. + temp of 100. no urinary symptoms no Denies neck pain/rigidity, photophobia, rash, chest pain, vomiting, abdominal pain, urinary symptoms, and change in bowel movement. Well appearing, NAD, non toxic. NCAT PERRLA EOMI neck supple non tender normal wob cta bl rrr abdomen s nt nd no rebound no guarding WWPx4 neuro non focal. Pt + for enterovirus will DC.

## 2021-11-01 NOTE — ED PROVIDER NOTE - NS ED ROS FT
Constitutional: + fever, and chills. Negative for weight change, and fatigue.  HENT: +headache. Negative for hearing change, ear pain, nasal congestion, and sore throat.  Eyes: Negative for eye pain, eye discharge, foreign body sensation, and vision change.  Cardiovascular: Negative for chest pain, palpitation, and orthopnea.  Respiratory: Negative for SOB, wheezing, cough and sputum production.  Gastrointestinal: + nausea. Negative for  vomiting, abdominal pain, constipation, diarrhea, hematochezia, and melena.  Genitourinary: Negative for flank pain, dysuria, urgency, frequency, hematuria, and urinary retention.  Neurological: Negative for dizziness, syncope, focal weakness, numbness, and loss of consciousness.  Musculoskeletal: Negative for joint swelling, arthralgias, back pain, neck pain, and calf cramps.  Hematological: Negative for adenopathy. Does not bruise/bleed easily.  Psychiatric/Behavioral: Negative for anxiety/panic, depression, delusions, SI/HI, and memory changes. Constitutional: + fever, and chills.  HENT: +headache. Negative for hearing change, ear pain, nasal congestion, and sore throat.  Cardiovascular: Negative for chest pain.  Respiratory: Negative for SOB, wheezing, cough and sputum production.  Gastrointestinal: + nausea. Negative for  vomiting, abdominal pain.  Neurological: Negative for dizziness, syncope, focal weakness, numbness, and loss of consciousness.

## 2022-12-02 ENCOUNTER — NON-APPOINTMENT (OUTPATIENT)
Age: 42
End: 2022-12-02

## 2023-01-13 RX ORDER — PROPRANOLOL HYDROCHLORIDE 60 MG/1
60 CAPSULE, EXTENDED RELEASE ORAL
Qty: 90 | Refills: 3 | Status: ACTIVE | COMMUNITY
Start: 2021-01-05 | End: 1900-01-01

## 2023-05-03 RX ORDER — SUMATRIPTAN 100 MG/1
100 TABLET, FILM COATED ORAL
Qty: 15 | Refills: 3 | Status: ACTIVE | COMMUNITY
Start: 2020-08-28 | End: 1900-01-01

## 2023-05-18 RX ORDER — RIBOFLAVIN (VITAMIN B2) 400 MG
400 TABLET ORAL
Qty: 30 | Refills: 5 | Status: ACTIVE | COMMUNITY
Start: 2021-01-05 | End: 1900-01-01

## 2023-10-17 NOTE — ED ADULT NURSE NOTE - NS PRO PASSIVE SMOKE EXP
Unknown
How Severe Is Your Skin Lesion?: moderate
Is This A New Presentation, Or A Follow-Up?: Skin Lesions

## 2024-02-07 NOTE — ED PROVIDER NOTE - NS ED MD DISPO DISCHARGE
Chief Complaint   Patient presents with    Medicare AWV         Health Maintenance Due   Topic Date Due    DTaP/Tdap/Td vaccine (1 - Tdap) Never done    Colorectal Cancer Screen  Never done    DEXA (modify frequency per FRAX score)  Never done    Respiratory Syncytial Virus (RSV) Pregnant or age 60 yrs+ (1 - 1-dose 60+ series) Never done    Pneumococcal 65+ years Vaccine (2 - PCV) 04/25/2020    Flu vaccine (1) 08/01/2023    COVID-19 Vaccine (3 - 2023-24 season) 09/01/2023    Annual Wellness Visit (Medicare)  Never done    Depression Screen  01/30/2024         \"Have you been to the ER, urgent care clinic since your last visit?  Hospitalized since your last visit?\"    NO    “Have you seen or consulted any other health care providers outside of [facility] since your last visit?”    NO    “Have you had a colorectal cancer screening such as a colonoscopy/FIT/Cologuard?    No           Home

## 2025-03-12 NOTE — ED ADULT NURSE NOTE - CAS EDP DISCH TYPE
Abe Hernández  [address]  [place] [address]    March 12, 2025    YOB: 1982    To Whom it may concern,    This letter is to confirm that Abe Hernández was evaluated by Dr. Wlil Mo   for a cardiac screening as required by his employer after an exacerbation of PTSD due to an on-duty incident.     His cardiac evaluation included an Echocardiogram, CT scan, and lab work.All of his cardiac screening was normal, except for slightly elevated cholesterol levels in the borderline range.     While this does not indicate an immediate health concern, Abe Hernández has been advised to implement lifestyle modifications to help lower his cholesterol and to undergo periodic cholesterol level checks for continued monitoring.    Please reach out to my office with any additional questions.    Sincerely,            Will Mo MD  [facility] [place] [id number]  [address]  [address]  [facility] [address]  Dept: [phone]  
Home

## 2025-06-04 ENCOUNTER — NON-APPOINTMENT (OUTPATIENT)
Age: 45
End: 2025-06-04

## 2025-08-23 ENCOUNTER — NON-APPOINTMENT (OUTPATIENT)
Age: 45
End: 2025-08-23